# Patient Record
Sex: MALE | Race: WHITE | Employment: STUDENT | URBAN - NONMETROPOLITAN AREA
[De-identification: names, ages, dates, MRNs, and addresses within clinical notes are randomized per-mention and may not be internally consistent; named-entity substitution may affect disease eponyms.]

---
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
## 2025-03-10 ENCOUNTER — HOSPITAL ENCOUNTER (OUTPATIENT)
Age: 25
Discharge: HOME | End: 2025-03-10
Payer: COMMERCIAL

## 2025-03-10 ENCOUNTER — OFFICE VISIT (OUTPATIENT)
Dept: FAMILY MEDICINE CLINIC | Age: 25
End: 2025-03-10
Payer: COMMERCIAL

## 2025-03-10 ENCOUNTER — RESULTS FOLLOW-UP (OUTPATIENT)
Dept: GENERAL RADIOLOGY | Age: 25
End: 2025-03-10

## 2025-03-10 ENCOUNTER — HOSPITAL ENCOUNTER (OUTPATIENT)
Dept: GENERAL RADIOLOGY | Age: 25
Discharge: HOME | End: 2025-03-10
Payer: COMMERCIAL

## 2025-03-10 VITALS
BODY MASS INDEX: 28.85 KG/M2 | OXYGEN SATURATION: 98 % | SYSTOLIC BLOOD PRESSURE: 118 MMHG | HEIGHT: 72 IN | WEIGHT: 213 LBS | RESPIRATION RATE: 14 BRPM | HEART RATE: 82 BPM | DIASTOLIC BLOOD PRESSURE: 74 MMHG

## 2025-03-10 DIAGNOSIS — M79.675 GREAT TOE PAIN, LEFT: ICD-10-CM

## 2025-03-10 DIAGNOSIS — Z00.00 ENCOUNTER FOR MEDICAL EXAMINATION TO ESTABLISH CARE: Primary | ICD-10-CM

## 2025-03-10 PROCEDURE — 73660 X-RAY EXAM OF TOE(S): CPT

## 2025-03-10 PROCEDURE — 99204 OFFICE O/P NEW MOD 45 MIN: CPT | Performed by: NURSE PRACTITIONER

## 2025-03-10 PROCEDURE — G8427 DOCREV CUR MEDS BY ELIG CLIN: HCPCS | Performed by: NURSE PRACTITIONER

## 2025-03-10 PROCEDURE — 4004F PT TOBACCO SCREEN RCVD TLK: CPT | Performed by: NURSE PRACTITIONER

## 2025-03-10 PROCEDURE — G8419 CALC BMI OUT NRM PARAM NOF/U: HCPCS | Performed by: NURSE PRACTITIONER

## 2025-03-10 RX ORDER — SULFAMETHOXAZOLE AND TRIMETHOPRIM 800; 160 MG/1; MG/1
1 TABLET ORAL 2 TIMES DAILY
Qty: 6 TABLET | Refills: 0 | Status: SHIPPED | OUTPATIENT
Start: 2025-03-10 | End: 2025-03-13

## 2025-03-10 SDOH — ECONOMIC STABILITY: FOOD INSECURITY: WITHIN THE PAST 12 MONTHS, YOU WORRIED THAT YOUR FOOD WOULD RUN OUT BEFORE YOU GOT MONEY TO BUY MORE.: NEVER TRUE

## 2025-03-10 SDOH — ECONOMIC STABILITY: FOOD INSECURITY: WITHIN THE PAST 12 MONTHS, THE FOOD YOU BOUGHT JUST DIDN'T LAST AND YOU DIDN'T HAVE MONEY TO GET MORE.: NEVER TRUE

## 2025-03-10 ASSESSMENT — PATIENT HEALTH QUESTIONNAIRE - PHQ9
SUM OF ALL RESPONSES TO PHQ QUESTIONS 1-9: 0
2. FEELING DOWN, DEPRESSED OR HOPELESS: NOT AT ALL
1. LITTLE INTEREST OR PLEASURE IN DOING THINGS: NOT AT ALL

## 2025-03-10 ASSESSMENT — ENCOUNTER SYMPTOMS: SHORTNESS OF BREATH: 0

## 2025-03-10 NOTE — PROGRESS NOTES
[facility]  [address] [address]  Dept: [phone]  Dept Fax: [phone]  Loc: [phone]     Visit Date:  3/10/2025    Patient:  Andrea Lerma  YOB: 2000  Age: 24 y.o.  Gender: male  BMI: Body mass index is 28.89 kg/m².    Andrea Lerma, new patient, is being seen today for   Chief Complaint   Patient presents with   • New Patient   .     Assessment/Plan      1. Encounter for medical examination to establish care  - Reviewed appropriate available medical records    2. Great toe pain, left  - Acute  - Further evaluate with an xray  - Start oral antibiotic to cover for paronychia  - Monitor symptoms. If symptoms do not improve, or worsen again, likely will require toe nail removal  - Okay for warm water soaks  - XR TOE LEFT (MIN 2 VIEWS); Future  - sulfamethoxazole-trimethoprim (BACTRIM DS;SEPTRA DS) 800-160 MG per tablet; Take 1 tablet by mouth 2 times daily for 3 days  Dispense: 6 tablet; Refill: 0        Return if symptoms worsen or fail to improve.    HPI:     Patient presents today for a new patient appointment. Former patient of Dr. Berger. Health maintenance reviewed. Negative medical history. No current specialists. Current concerns include left foot pain x 2 weeks. Stubbed his toe, then dropped a couch on the same toe/foot. Pain isolated to the right great toe at this time. Improved slightly after patient removed what he describes as an ingrown toenail. Since then, pain has again worsened. Overall, redness and swelling have improved though. Purulent drainage has resolved.    Foot Pain   The pain is present in the left foot and left toes. This is a new problem. The current episode started 1 to 4 weeks ago. There has been a history of trauma. The problem occurs daily. The problem has been gradually worsening. The quality of the pain is described as aching. The pain is at a severity of 2/10. The

## 2025-03-11 NOTE — TELEPHONE ENCOUNTER
Patient notified  States antibiotic was not at pharmacy from office visit yesterday    Bactrim DS 1 BID x 3 days #6 with 0RF called to [address] voicemail

## 2025-03-11 NOTE — TELEPHONE ENCOUNTER
----- Message from PATRICIA Coronado CNP sent at 3/10/2025  4:05 PM EDT -----  No fracture noted on xray. Continue with antibiotic. Follow up if pain remains or returns after treatment.

## 2025-03-21 ENCOUNTER — TELEPHONE (OUTPATIENT)
Dept: FAMILY MEDICINE CLINIC | Age: 25
End: 2025-03-21

## 2025-03-21 NOTE — TELEPHONE ENCOUNTER
Luis requesting to contact Pt for an update on symptoms.  Xray results   No fracture noted on xray. Continue with antibiotic. Follow up if pain remains or returns after treatment.

## 2025-04-29 ENCOUNTER — OFFICE VISIT (OUTPATIENT)
Dept: FAMILY MEDICINE CLINIC | Age: 25
End: 2025-04-29
Payer: COMMERCIAL

## 2025-04-29 VITALS
HEART RATE: 70 BPM | RESPIRATION RATE: 20 BRPM | WEIGHT: 216 LBS | BODY MASS INDEX: 29.29 KG/M2 | SYSTOLIC BLOOD PRESSURE: 110 MMHG | DIASTOLIC BLOOD PRESSURE: 70 MMHG

## 2025-04-29 DIAGNOSIS — Z98.890 HISTORY OF LEFT KNEE SURGERY: ICD-10-CM

## 2025-04-29 DIAGNOSIS — S89.92XA ACUTE INJURY OF LEFT KNEE CARTILAGE, INITIAL ENCOUNTER: Primary | ICD-10-CM

## 2025-04-29 PROCEDURE — 4004F PT TOBACCO SCREEN RCVD TLK: CPT | Performed by: NURSE PRACTITIONER

## 2025-04-29 PROCEDURE — G8419 CALC BMI OUT NRM PARAM NOF/U: HCPCS | Performed by: NURSE PRACTITIONER

## 2025-04-29 PROCEDURE — G8427 DOCREV CUR MEDS BY ELIG CLIN: HCPCS | Performed by: NURSE PRACTITIONER

## 2025-04-29 PROCEDURE — 99214 OFFICE O/P EST MOD 30 MIN: CPT | Performed by: NURSE PRACTITIONER

## 2025-04-29 RX ORDER — NAPROXEN 500 MG/1
500 TABLET ORAL 2 TIMES DAILY WITH MEALS
Qty: 60 TABLET | Refills: 0 | Status: SHIPPED | OUTPATIENT
Start: 2025-04-29

## 2025-04-29 NOTE — PROGRESS NOTES
Andrea Lerma (:  2000) is a 24 y.o. male,Established patient, here for evaluation of the following chief complaint(s):  Knee Injury        Assessment & Plan   1. Acute injury of left knee cartilage, initial encounter  - Acute  - Further evaluate with MRI  - Compression use encouraged  - Ice and nsaids as needed for pain  -     MRI KNEE LEFT WO CONTRAST; Future  -     naproxen (NAPROSYN) 500 MG tablet; Take 1 tablet by mouth 2 times daily (with meals)    2. History of left knee surgery  -     MRI KNEE LEFT WO CONTRAST; Future      Return if symptoms worsen or fail to improve.       Subjective     Patient presents for evaluation of knee pain. Hx of acl and meniscus repair of the same knee in 2019 by Dr. Dutta. Patient fell on the left knee while carrying furniture. Pain has not improved since the injury on Saturday. Pain worse with walking upstairs. Pain is medial and anterior. Reports the feeling of instability in the knee.    Knee Pain   The incident occurred 3 to 5 days ago (Saturday). The incident occurred in the street. The injury mechanism was a fall. The pain is present in the left knee. The quality of the pain is described as aching. The pain is at a severity of 4/10 (6-7/10 when walking on stairs). The pain has been Constant since onset. Associated symptoms include muscle weakness. Pertinent negatives include no numbness or tingling. The symptoms are aggravated by weight bearing. He has tried ice, NSAIDs and acetaminophen for the symptoms.       Review of Systems   Musculoskeletal:  Positive for arthralgias and joint swelling.   Neurological:  Negative for tingling and numbness.          Objective   Physical Exam  Vitals and nursing note reviewed.   Constitutional:       General: He is not in acute distress.     Appearance: Normal appearance.   HENT:      Head: Normocephalic and atraumatic.      Right Ear: External ear normal.      Left Ear: External ear normal.      Nose: No nasal deformity

## 2025-05-16 ENCOUNTER — HOSPITAL ENCOUNTER (OUTPATIENT)
Dept: MRI IMAGING | Age: 25
Discharge: HOME | End: 2025-05-16
Payer: COMMERCIAL

## 2025-05-16 DIAGNOSIS — S89.92XA ACUTE INJURY OF LEFT KNEE CARTILAGE, INITIAL ENCOUNTER: ICD-10-CM

## 2025-05-16 DIAGNOSIS — Z98.890 HISTORY OF LEFT KNEE SURGERY: ICD-10-CM

## 2025-05-16 PROCEDURE — 73721 MRI JNT OF LWR EXTRE W/O DYE: CPT

## 2025-05-17 ENCOUNTER — RESULTS FOLLOW-UP (OUTPATIENT)
Dept: FAMILY MEDICINE CLINIC | Age: 25
End: 2025-05-17